# Patient Record
Sex: FEMALE | Race: OTHER | Employment: OTHER | URBAN - METROPOLITAN AREA
[De-identification: names, ages, dates, MRNs, and addresses within clinical notes are randomized per-mention and may not be internally consistent; named-entity substitution may affect disease eponyms.]

---

## 2017-08-23 NOTE — PATIENT DISCUSSION
The patient feels that the cataract is significantly impacting daily activities and has elected cataract surgery. The risks, benefits, and alternatives to surgery were discussed. The patient elects to proceed with surgery. WLS needs clearance for Dr Raegan Almodovar prior to the cat surgery OS. Patitent elects Basic OS not a candidate for other optons.

## 2020-06-01 NOTE — PATIENT DISCUSSION
Patient advised of the right to post-operative care by the surgeon. Patient is fully informed of, and agreed to, co-management with their primary optometric physician. Post-operative care by the surgeon is not medically necessary and co-management is clinically appropriate. Patient has received itemization of fees related to cataract surgery. Transfer of care letter completed for the patient. Transfer care of the left eye to Dr. Damion Lal on 06-. Patient instructed to call immediately if any new distortion, blurring, decreased vision or eye pain.

## 2020-06-08 NOTE — PATIENT DISCUSSION
Patient advised of the right to post-operative care by the surgeon. Patient is fully informed of, and agreed to, co-management with their primary optometric physician. Post-operative care by the surgeon is not medically necessary and co-management is clinically appropriate. Patient has received itemization of fees related to cataract surgery. Transfer of care letter completed for the patient. Transfer care of the right eye to Dr. James Kennedy on 06-. Patient instructed to call immediately if any new distortion, blurring, decreased vision or eye pain.

## 2022-03-02 ENCOUNTER — ESTABLISHED PATIENT (OUTPATIENT)
Dept: URBAN - METROPOLITAN AREA CLINIC 7 | Facility: CLINIC | Age: 72
End: 2022-03-02

## 2022-03-02 DIAGNOSIS — H52.223: ICD-10-CM

## 2022-03-02 DIAGNOSIS — H43.813: ICD-10-CM

## 2022-03-02 PROCEDURE — 92014 COMPRE OPH EXAM EST PT 1/>: CPT

## 2022-03-02 PROCEDURE — 92310C CONTACT LENS 75

## 2022-03-02 PROCEDURE — 92015 DETERMINE REFRACTIVE STATE: CPT

## 2022-03-02 ASSESSMENT — KERATOMETRY
OS_K1POWER_DIOPTERS: 42.75
OD_AXISANGLE_DEGREES: 015
OD_K1POWER_DIOPTERS: 42.25
OS_AXISANGLE_DEGREES: 166
OD_AXISANGLE2_DEGREES: 105
OD_K2POWER_DIOPTERS: 44.25
OS_AXISANGLE2_DEGREES: 76
OS_K2POWER_DIOPTERS: 44.50

## 2022-03-02 ASSESSMENT — VISUAL ACUITY
OS_SC: 20/200
OU_SC: 20/200
OD_SC: 20/200

## 2022-03-02 ASSESSMENT — TONOMETRY
OS_IOP_MMHG: 15
OD_IOP_MMHG: 15

## 2022-04-26 ENCOUNTER — ESTABLISHED PATIENT (OUTPATIENT)
Dept: URBAN - METROPOLITAN AREA CLINIC 14 | Facility: CLINIC | Age: 72
End: 2022-04-26

## 2022-04-26 DIAGNOSIS — H02.832: ICD-10-CM

## 2022-04-26 DIAGNOSIS — H02.834: ICD-10-CM

## 2022-04-26 DIAGNOSIS — H02.835: ICD-10-CM

## 2022-04-26 DIAGNOSIS — H02.831: ICD-10-CM

## 2022-04-26 PROCEDURE — 92285 EXTERNAL OCULAR PHOTOGRAPHY: CPT

## 2022-04-26 PROCEDURE — 99213 OFFICE O/P EST LOW 20 MIN: CPT

## 2022-04-26 ASSESSMENT — VISUAL ACUITY
OS_SC: 20/200
OD_SC: 20/200

## 2022-06-28 RX ORDER — ZOLEDRONIC ACID 5 MG/100ML
INJECTION, SOLUTION INTRAVENOUS
COMMUNITY

## 2022-06-28 RX ORDER — SIMVASTATIN 20 MG
TABLET ORAL
COMMUNITY
End: 2022-10-26

## 2022-06-28 RX ORDER — IBUPROFEN 200 MG
CAPSULE ORAL
COMMUNITY

## 2022-06-28 RX ORDER — ZOLPIDEM TARTRATE 5 MG/1
TABLET ORAL
COMMUNITY
Start: 2021-11-05

## 2022-08-31 ENCOUNTER — POST-OP (OUTPATIENT)
Dept: URBAN - METROPOLITAN AREA CLINIC 14 | Facility: CLINIC | Age: 72
End: 2022-08-31

## 2022-08-31 DIAGNOSIS — H02.834: ICD-10-CM

## 2022-08-31 DIAGNOSIS — H02.831: ICD-10-CM

## 2022-08-31 DIAGNOSIS — Z98.890: ICD-10-CM

## 2022-08-31 DIAGNOSIS — H02.832: ICD-10-CM

## 2022-08-31 DIAGNOSIS — H02.835: ICD-10-CM

## 2022-08-31 PROCEDURE — 99024 POSTOP FOLLOW-UP VISIT: CPT

## 2022-08-31 ASSESSMENT — VISUAL ACUITY
OD_SC: 20/200
OS_SC: 20/200

## 2022-10-05 ENCOUNTER — POST-OP (OUTPATIENT)
Dept: URBAN - METROPOLITAN AREA CLINIC 14 | Facility: CLINIC | Age: 72
End: 2022-10-05

## 2022-10-05 DIAGNOSIS — H02.834: ICD-10-CM

## 2022-10-05 DIAGNOSIS — H02.835: ICD-10-CM

## 2022-10-05 DIAGNOSIS — H02.831: ICD-10-CM

## 2022-10-05 DIAGNOSIS — Z98.890: ICD-10-CM

## 2022-10-05 DIAGNOSIS — H02.832: ICD-10-CM

## 2022-10-05 PROCEDURE — 99024 POSTOP FOLLOW-UP VISIT: CPT

## 2022-10-05 ASSESSMENT — VISUAL ACUITY
OD_SC: 20/200
OS_SC: 20/200

## 2023-06-15 ENCOUNTER — ESTABLISHED PATIENT (OUTPATIENT)
Dept: URBAN - METROPOLITAN AREA CLINIC 10 | Facility: CLINIC | Age: 73
End: 2023-06-15

## 2023-06-15 DIAGNOSIS — H52.223: ICD-10-CM

## 2023-06-15 DIAGNOSIS — H43.813: ICD-10-CM

## 2023-06-15 PROCEDURE — 92015 DETERMINE REFRACTIVE STATE: CPT

## 2023-06-15 PROCEDURE — 92014 COMPRE OPH EXAM EST PT 1/>: CPT

## 2023-06-15 PROCEDURE — 92250 FUNDUS PHOTOGRAPHY W/I&R: CPT

## 2023-06-15 ASSESSMENT — KERATOMETRY
OD_K1POWER_DIOPTERS: 42.00
OS_K1POWER_DIOPTERS: 43.00
OS_AXISANGLE2_DEGREES: 75
OD_AXISANGLE_DEGREES: 15
OD_AXISANGLE2_DEGREES: 105
OD_K2POWER_DIOPTERS: 43.25
OS_AXISANGLE_DEGREES: 165
OS_K2POWER_DIOPTERS: 43.75

## 2023-06-15 ASSESSMENT — TONOMETRY
OD_IOP_MMHG: 13
OS_IOP_MMHG: 13

## 2023-06-15 ASSESSMENT — VISUAL ACUITY
OS_CC: 20/25+1
OU_CC: 20/25+2
OD_CC: 20/30+2

## 2024-06-21 ENCOUNTER — ESTABLISHED PATIENT (OUTPATIENT)
Facility: LOCATION | Age: 74
End: 2024-06-21

## 2024-06-21 DIAGNOSIS — H43.813: ICD-10-CM

## 2024-06-21 DIAGNOSIS — H52.223: ICD-10-CM

## 2024-06-21 PROCEDURE — 92015 DETERMINE REFRACTIVE STATE: CPT

## 2024-06-21 PROCEDURE — 92014 COMPRE OPH EXAM EST PT 1/>: CPT

## 2024-06-21 PROCEDURE — 92250 FUNDUS PHOTOGRAPHY W/I&R: CPT

## 2024-06-21 ASSESSMENT — KERATOMETRY
OS_AXISANGLE2_DEGREES: 66
OD_AXISANGLE_DEGREES: 10
OD_AXISANGLE2_DEGREES: 100
OS_K2POWER_DIOPTERS: 43.75
OD_K2POWER_DIOPTERS: 44.75
OS_AXISANGLE_DEGREES: 156
OD_K1POWER_DIOPTERS: 42.25
OS_K1POWER_DIOPTERS: 42.75

## 2024-06-21 ASSESSMENT — VISUAL ACUITY
OU_CC: 20/25
OS_CC: 20/30
OD_CC: 20/25-1

## 2024-06-21 ASSESSMENT — TONOMETRY
OS_IOP_MMHG: 13
OD_IOP_MMHG: 10

## 2025-06-23 ENCOUNTER — COMPREHENSIVE EXAM (OUTPATIENT)
Age: 75
End: 2025-06-23

## 2025-06-23 DIAGNOSIS — H43.813: ICD-10-CM

## 2025-06-23 DIAGNOSIS — H52.223: ICD-10-CM

## 2025-06-23 PROCEDURE — 92250 FUNDUS PHOTOGRAPHY W/I&R: CPT

## 2025-06-23 PROCEDURE — 92015 DETERMINE REFRACTIVE STATE: CPT

## 2025-06-23 PROCEDURE — 92014 COMPRE OPH EXAM EST PT 1/>: CPT
